# Patient Record
Sex: FEMALE | Race: WHITE
[De-identification: names, ages, dates, MRNs, and addresses within clinical notes are randomized per-mention and may not be internally consistent; named-entity substitution may affect disease eponyms.]

---

## 2018-05-17 ENCOUNTER — HOSPITAL ENCOUNTER (OUTPATIENT)
Dept: HOSPITAL 88 - ER | Age: 58
Setting detail: OBSERVATION
LOS: 1 days | Discharge: HOME | End: 2018-05-18
Attending: FAMILY MEDICINE | Admitting: FAMILY MEDICINE
Payer: COMMERCIAL

## 2018-05-17 VITALS — BODY MASS INDEX: 27.29 KG/M2 | HEIGHT: 63 IN | WEIGHT: 154 LBS

## 2018-05-17 VITALS — SYSTOLIC BLOOD PRESSURE: 116 MMHG | DIASTOLIC BLOOD PRESSURE: 69 MMHG

## 2018-05-17 VITALS — DIASTOLIC BLOOD PRESSURE: 69 MMHG | SYSTOLIC BLOOD PRESSURE: 116 MMHG

## 2018-05-17 LAB
ALBUMIN SERPL-MCNC: 3.7 G/DL (ref 3.5–5)
ALBUMIN/GLOB SERPL: 1.2 {RATIO} (ref 0.8–2)
ALP SERPL-CCNC: 64 IU/L (ref 40–150)
ALT SERPL-CCNC: 16 IU/L (ref 0–55)
ANION GAP SERPL CALC-SCNC: 14.1 MMOL/L (ref 8–16)
BACTERIA URNS QL MICRO: (no result) /HPF
BASOPHILS # BLD AUTO: 0.1 10*3/UL (ref 0–0.1)
BASOPHILS NFR BLD AUTO: 0.6 % (ref 0–1)
BILIRUB UR QL: NEGATIVE
BUN SERPL-MCNC: 9 MG/DL (ref 7–26)
BUN/CREAT SERPL: 11 (ref 6–25)
CALCIUM SERPL-MCNC: 9.1 MG/DL (ref 8.4–10.2)
CHLORIDE SERPL-SCNC: 104 MMOL/L (ref 98–107)
CHOLEST SERPL-MCNC: 284 MD/DL (ref 0–199)
CHOLEST/HDLC SERPL: 4.5 {RATIO} (ref 3–3.6)
CLARITY UR: CLEAR
CO2 SERPL-SCNC: 25 MMOL/L (ref 22–29)
COLOR UR: YELLOW
DEPRECATED FTI SERPL-MCNC: 2.22 UG/DL (ref 1.4–3.8)
DEPRECATED INR PLAS: 0.91
DEPRECATED NEUTROPHILS # BLD AUTO: 5.6 10*3/UL (ref 2.1–6.9)
DEPRECATED RBC URNS MANUAL-ACNC: (no result) /HPF (ref 0–5)
EGFRCR SERPLBLD CKD-EPI 2021: > 60 ML/MIN (ref 60–?)
EOSINOPHIL # BLD AUTO: 0.1 10*3/UL (ref 0–0.4)
EOSINOPHIL NFR BLD AUTO: 0.9 % (ref 0–6)
EPI CELLS URNS QL MICRO: (no result) /LPF
ERYTHROCYTE [DISTWIDTH] IN CORD BLOOD: 13.4 % (ref 11.7–14.4)
GLOBULIN PLAS-MCNC: 3.2 G/DL (ref 2.3–3.5)
GLUCOSE SERPLBLD-MCNC: 97 MG/DL (ref 74–118)
HCT VFR BLD AUTO: 44.8 % (ref 34.2–44.1)
HDLC SERPL-MSCNC: 63 MG/DL (ref 40–60)
HGB BLD-MCNC: 14.9 G/DL (ref 12–16)
HYALINE CASTS #/AREA URNS LPF: (no result) /[LPF] (ref 0–1)
KETONES UR QL STRIP.AUTO: NEGATIVE
LDLC SERPL CALC-MCNC: 201 MG/DL (ref 60–130)
LEUKOCYTE ESTERASE UR QL STRIP.AUTO: NEGATIVE
LYMPHOCYTES # BLD: 2.5 10*3/UL (ref 1–3.2)
LYMPHOCYTES NFR BLD AUTO: 28.8 % (ref 18–39.1)
MCH RBC QN AUTO: 30 PG (ref 28–32)
MCHC RBC AUTO-ENTMCNC: 33.3 G/DL (ref 31–35)
MCV RBC AUTO: 90.1 FL (ref 81–99)
MONOCYTES # BLD AUTO: 0.4 10*3/UL (ref 0.2–0.8)
MONOCYTES NFR BLD AUTO: 4.5 % (ref 4.4–11.3)
MUCOUS THREADS URNS QL MICRO: (no result)
NEUTS SEG NFR BLD AUTO: 64.7 % (ref 38.7–80)
NITRITE UR QL STRIP.AUTO: NEGATIVE
PLATELET # BLD AUTO: 237 X10E3/UL (ref 140–360)
POTASSIUM SERPL-SCNC: 4.1 MMOL/L (ref 3.5–5.1)
PROT UR QL STRIP.AUTO: NEGATIVE
PROTHROMBIN TIME: 11.5 SECONDS (ref 11.9–14.5)
RBC # BLD AUTO: 4.97 X10E6/UL (ref 3.6–5.1)
SODIUM SERPL-SCNC: 139 MMOL/L (ref 136–145)
SP GR UR STRIP: 1.01 (ref 1.01–1.02)
TRIGL SERPL-MCNC: 102 MG/DL (ref 0–149)
TSH SERPL DL<=0.005 MIU/L-ACNC: 1.34 UIU/ML (ref 0.35–4.94)
UROBILINOGEN UR STRIP-MCNC: 0.2 MG/DL (ref 0.2–1)
WBC #/AREA URNS HPF: (no result) /HPF (ref 0–5)

## 2018-05-17 PROCEDURE — 84702 CHORIONIC GONADOTROPIN TEST: CPT

## 2018-05-17 PROCEDURE — 81001 URINALYSIS AUTO W/SCOPE: CPT

## 2018-05-17 PROCEDURE — 85025 COMPLETE CBC W/AUTO DIFF WBC: CPT

## 2018-05-17 PROCEDURE — 70544 MR ANGIOGRAPHY HEAD W/O DYE: CPT

## 2018-05-17 PROCEDURE — 82607 VITAMIN B-12: CPT

## 2018-05-17 PROCEDURE — 83036 HEMOGLOBIN GLYCOSYLATED A1C: CPT

## 2018-05-17 PROCEDURE — 84443 ASSAY THYROID STIM HORMONE: CPT

## 2018-05-17 PROCEDURE — 70547 MR ANGIOGRAPHY NECK W/O DYE: CPT

## 2018-05-17 PROCEDURE — 36415 COLL VENOUS BLD VENIPUNCTURE: CPT

## 2018-05-17 PROCEDURE — 85610 PROTHROMBIN TIME: CPT

## 2018-05-17 PROCEDURE — 93005 ELECTROCARDIOGRAM TRACING: CPT

## 2018-05-17 PROCEDURE — 80061 LIPID PANEL: CPT

## 2018-05-17 PROCEDURE — 70450 CT HEAD/BRAIN W/O DYE: CPT

## 2018-05-17 PROCEDURE — 93306 TTE W/DOPPLER COMPLETE: CPT

## 2018-05-17 PROCEDURE — 80053 COMPREHEN METABOLIC PANEL: CPT

## 2018-05-17 PROCEDURE — 70551 MRI BRAIN STEM W/O DYE: CPT

## 2018-05-17 PROCEDURE — 99284 EMERGENCY DEPT VISIT MOD MDM: CPT

## 2018-05-17 PROCEDURE — 84436 ASSAY OF TOTAL THYROXINE: CPT

## 2018-05-17 PROCEDURE — 84479 ASSAY OF THYROID (T3 OR T4): CPT

## 2018-05-17 NOTE — DIAGNOSTIC IMAGING REPORT
Exams: Brain MRI and cervical MRA and intracranial MRA without IV contrast



History: Left arm paresthesia

Comparison studies: Head CT 5/17/2018 and 5/5/2010.



Technique: 

Brain MRI: Sagittal and axial T2, axial coronal T2 flair, axial T2*GRE, axial

T1 and axial DWI.

Cervical MRA: Axial 2-D time-of-flight with 3-D MIP reformats.

Intracranial MRA: Axial 3-D time-of-flight with 3-D MIP reformats..

Intravenous contrast: None



Findings:



Brain:



Scalp: No abnormal signal.  No masses.

Bone marrow: Normal in signal intensity.



Brain sulci: Normal in size.

Ventricles: Normal in size. No hydrocephalus.

Extra-axial spaces: Subtle scattered nonspecific leptomeningeal increased T2

FLAIR hyperintensity. No mass or extra-axial fluid collection.



Parenchyma:

No abnormal signal intensities.

No masses, hemorrhage, acute or chronic vascular insults.



Suprasellar region: No abnormalities.

Craniocervical junction: No abnormalities. The foramen magnum is patent. No

Chiari malformations.

Vessels: Normal flow-voids in the arteries and sinuses.



Cervical MRA:



If present, stenosis at the carotid bulbs is calculated utilizing the NASCET

method which calculates the degree of stenosis with reference to the normal

lumen of the carotid artery distal to the stenosis.





Exam is limited by artifacts related to patient and pulsation motion artifacts.



Common carotid arteries: Cannot adequately evaluate the origin of the left

common carotid artery as it lies outside imaged field-of-view. Otherwise

patent, no flow abnormalities bilaterally in the remaining segments.

Carotid bulbs: No gross hemodynamically significant stenosis at the carotid

bulbs.

Internal carotid arteries: Patent, no flow abnormalities.



Vertebral arteries:  Cannot adequately evaluate the origins due to motion

artifacts. Otherwise patent, no signal abnormalities bilaterally in the

remaining segments.



Intracranial MRA:



Internal carotid arteries: Patent, no flow signal abnormalities.

Anterior cerebral arteries: Patent, no flow signal abnormalities in the A1 and

A2 segments.

Middle cerebral arteries: Patent, no flow abnormalities in the M1 and proximal

M2 segments.



Vertebral arteries:

Patent, no flow abnormalities.

Basilar artery: Patent, no flow abnormalities.

Posterior cervical arteries: Patent, no flow abnormalities. The right T1

segment is mildly hypoplastic and there is a anatomical variant prominent right

posterior commuting artery.



Anatomical variants:

Acom: Visualized with variant trifurcated A2 segments.

Pcoms: Patent on the right. Not visualized on the left.

Vertebral arteries: Left is slightly dominant.



IMPRESSION:



Brain:

1.  Subtle scattered leptomeningeal increased T2 FLAIR hyperintense signal

changes are nonspecific and may artifactual. Similar findings have been

reported patient's who are undergoing hyper-oxygenation therapy or have had

recent anesthesia in the appropriate clinical setting. Canal exclude the

possibility of nonspecific leptomeningitis. Lumbar puncture could further

evaluate as clinically warranted.

2.  No additional intracranial abnormalities.





Cervical MRA:

1.  Limited exam due to motion artifacts.

2.  Patent carotid and vertebral arteries.

3.  No gross hemodynamically significant stenosis at the cervical carotid bulbs

(approximately 0% stenosis by NASCET).



Intracranial MRA:

No abnormalities.







Signed by: Dr. Guillermo Marin M.D. on 5/17/2018 5:53 PM

## 2018-05-17 NOTE — DIAGNOSTIC IMAGING REPORT
Examination: CT BRAIN WITHOUT CONTRAST



History:Left-sided arm numbness.

Comparison studies:Head CT performed May 5, 2010.



Technique:

Axial images were obtained from the skull base to the vertex.

Coronal and sagittal images reconstructed from the axial data.

Intravenous contrast: None



Findings:



Scalp: No abnormalities.

Bones: No fractures, blastic or lytic lesions.



Brain sulci: Appropriate for age.

Ventricles: Normal in size and configuration. No hydrocephalus.



Extra-axial space:

No abnormalities.



Parenchyma: 

No abnormal densities. 

No masses, hemorrhage, or acute or chronic cortical based vascular insults.



Sellar/suprasellar region: No abnormalities.

Craniocervical junction: Patent foramen magnum. No Chiari one malformation.



Incidental findings: 

None.



Impression:

 

No new or acute intracranial abnormalities. No change from prior head CT

performed May 5, 2010.



Signed by: Dr. Janay Hebert M.D. on 5/17/2018 11:25 AM

## 2018-05-17 NOTE — HISTORY AND PHYSICAL
CHIEF COMPLAINT:  A 58-year-old female comes with left arm weakness and 

paresthesias in the left.



HISTORY OF PRESENTING ILLNESS:  His is Ms. Bhumika Bernard, a 58-year-old 

lady with a history of hypertension, history of attention deficit disease, 

who was in her usual state of health until a week prior to admission the 

patient had right-sided weakness and right-sided paresthesias with some 

aphasia. Was seen at the emergency room. Patient was supposed to be 

admitted, but due to the lack of personnel, apparently the patient decided 

to leave the hospital and came to my office. MRI was ordered, and 

echocardiogram was ordered. The patient has not still done those tests, and 

today the patient comes in with left-sided paresthesias and weakness and 

the patient is admitted for possible TIA versus stroke. No aphasia at this 

time. 



PAST MEDICAL HISTORY:  History of hypertension and also a history of 

attention deficit disorder and insomnia.



SURGICAL HISTORY:  Noncontributory.



SOCIAL HISTORY:  Positive for smoking. No ETOH. No IV drug abuse either.



REVIEW OF SYSTEMS:  Negative for chest pain. Positive for some shortness of 

breath. No nausea, vomiting, diarrhea. No constipation, no rectal bleeding. 

No hematochezia, no hematemesis. No diplopia, no blurry vision. Positive 

for paresthesia, positive for weakness on left side this time. Also a 

history of weakness and aphasia in the past.



PHYSICAL EXAMINATION

GENERAL:  Patient is alert and oriented x3. 

VITAL SIGNS:  Temperature is not recorded yet. Pulse was 112 on arrival. 

Now it is 85. Blood pressure is 125/78.

HEENT:  Normocephalic, atraumatic. Pupils react to light and accommodation.

CARDIOVASCULAR:  S1 and S2 normal. Regular.

ABDOMEN:  Nontender, nondistended.

EXTREMITIES:  No clubbing, no cyanosis, no edema. 

NEUROLOGICAL:  Essentially nonfocal at this time. The patient does not show 

any focal sensory or motor deficits at this time. 



LABORATORY VALUES:  Initial white count is 8.6, hemoglobin 14.9, hematocrit 

44.8. Chemistry:  Sodium of 139, potassium 4.9, creatinine 0.79, BUN of 9. 

Coags are negative.



CT scan initial shows no new intracranial abnormalities, normal parenchyma, 

appropriate-for-age brain foci. Scalp, no abnormalities. 



ASSESSMENT:  Focal weakness, left-sided. A consult with Dr. Oviedo has 

been done. Will also go ahead and do an MRI and MRA of the neck. Also an 

echocardiogram will be done. Will also do a lipid panel. Further 

recommendations on clinical course. The patient has been given aspirin and 

will follow up with Neurology and on the MRIs.











DD:  05/17/2018 15:00

DT:  05/17/2018 15:41

Job#:  [id number]

## 2018-05-17 NOTE — XMS REPORT
Patient Summary Document

 Created on: 2018



DORINDA THACKER

External Reference #: [id number]

: 1960

Sex: Female



Demographics







 Address  [address] [place], [place] [id number]

 

 Home Phone  [phone]

 

 Preferred Language  Unknown

 

 Marital Status  Unknown

 

 Anabaptism Affiliation  Unknown

 

 Race  Unknown

 

 Additional Race(s)  

 

 Ethnic Group  Unknown





Author







 Author  [facility]

 

 Address  Unknown

 

 Phone  Unavailable







Care Team Providers







 Care Team Member Name  Role  Phone

 

 GENI MOONEY  Unavailable  Unavailable







Problems

This patient has no known problems.



Allergies, Adverse Reactions, Alerts

This patient has no known allergies or adverse reactions.



Medications

This patient has no known medications.



Results







 Test Description  Test Time  Test Comments  Text Results  Atomic Results  
Result Comments









 CT BRAIN WO            [place] [address]      Patient Name: DORINDA THACKER   MR #: [id number]    : 1960 Age/Sex: 58/F  Acct #: 
[id number] Req #: [id number]  Adm Physician:     Ordered by: GENI MOONEY MD  Report #: [id number]   Location: ER  Room/Bed:     ________________________
___________________________________________________________________________    
Procedure: [id number] CT/CT BRAIN WO  Exam Date: 18                       
     Exam Time: 1038       REPORT STATUS: Signed    Examination: CT BRAIN 
WITHOUT CONTRAST      History:Left-sided arm numbness.   Comparison studies:
Head CT performed May 5, 2010.      Technique:   Axial images were obtained 
from the skull base to the vertex.   Coronal and sagittal images reconstructed 
from the axial data.   Intravenous contrast: None      Findings:      Scalp: No 
abnormalities.   Bones: No fractures, blastic or lytic lesions.      Brain sulci
: Appropriate for age.   Ventricles: Normal in size and configuration. No 
hydrocephalus.      Extra-axial space:   No abnormalities.      Parenchyma:    
No abnormal densities.    No masses, hemorrhage, or acute or chronic cortical 
based vascular insults.      Sellar/suprasellar region: No abnormalities.   
Craniocervical junction: Patent foramen magnum. No Chiari one malformation.    
  Incidental findings:    None.      Impression:       No new or acute 
intracranial abnormalities. No change from prior head CT   performed May 5, 
2010.      Signed by: Dr. Janay Hebert M.D. on 2018 11:25 AM        
Dictated By: JANAY MARION MD  Electronically Signed By: JANAY MARION MD on 18 1125  Transcribed By: ZANE on 18 1125  
     COPY TO:   GENI MOONEY MD

## 2018-05-17 NOTE — XMS REPORT
Clinical Summary

 Created on: 2018



Dorinda Bernard

External Reference #: [id number]

: 1960

Sex: Female



Demographics







 Address  [address] [place] [id number]

 

 Home Phone  [phone]

 

 Preferred Language  English

 

 Marital Status  Single

 

 Faith Affiliation  Unknown

 

 Race  White

 

 Ethnic Group  Non-





Author







 Author  [facility]

 

 Organization  [facility]

 

 Address  Unknown

 

 Phone  Unavailable







Support







 Name  Relationship  Address  Phone

 

 Ancelmo Duffy  Unknown  [phone]







Care Team Providers







 Care Team Member Name  Role  Phone

 

 Asked, Pcp  PCP  Unavailable







Allergies

No Known Allergies



Current Medications







      



  Prescription   Sig.   Disp.   Refills   Start   End Date   Status



      Date  

 

      



  omeprazole (PriLOSEC) 40   Take 40 mg by mouth       Active



  MG capsule   daily.     

 

      



  rOPINIRole (REQUIP) 0.5   Take 0.5 mg by mouth       Active



  MG tablet   nightly.     

 

      



  dextroamphetamine/ampheta   Take by mouth 2 (two)       Active



  mine (ADDERALL ORAL)   times a day.     

 

      



  aspirin 81 mg chewable   Chew 1 tablet (81 mg   30 tablet   0   05/11/20   06/
10/20   Active



  tablet   total) daily for 30 days.     18   18 







Active Problems







 



  Problem   Noted Date

 

 



  Transient cerebral ischemia   2018







Encounters







    



  Date   Type   Specialty   Care Team   Description

 

    



  2018   Emergency   Emergency Medicine   Wilfrido Newton Transient cerebral



     MD   ischemia, unspecified



     Humza Kapoor MD   type (Primary Dx)



after 2017



Social History







    



  Tobacco Use   Types   Packs/Day   Years Used   Date

 

    



  Current Every Day Smoker   Cigarettes   1   40 

 

    



  Smokeless Tobacco: Never   



  Used   









   



  Alcohol Use   Drinks/Week   oz/Week   Comments

 

   



  No   









 



  Sex Assigned at Birth   Date Recorded

 

 



  Not on file 







Last Filed Vital Signs







  



  Vital Sign   Reading   Time Taken

 

  



  Blood Pressure   121/74   2018  9:47 PM CDT

 

  



  Pulse   84   2018  9:47 PM CDT

 

  



  Temperature   36.9   C (98.4   F)   2018  9:47 PM CDT

 

  



  Respiratory Rate   19   2018  9:47 PM CDT

 

  



  Oxygen Saturation   99%   2018  9:47 PM CDT

 

  



  Inhaled Oxygen   -   -



  Concentration  

 

  



  Weight   68 kg (150 lb)   2018  6:20 PM CDT

 

  



  Height   160 cm (5' 3")   2018  6:20 PM CDT

 

  



  Body Mass Index   26.57   2018  6:20 PM CDT







Plan of Treatment







   



  Health Maintenance   Due Date   Last Done   Comments

 

   



  CERVICAL CANCER SCREENING   1983  

 

   



  BREAST CANCER SCREENING   2012  

 

   



  COLON CANCER SCREENING   2012  

 

   



  SHINGRIX VACCINE (#1)   2012  

 

   



  INFLUENZA VACCINE   2018  







Results

* CT Head Wo Contrast (2018  7:24 PM)





 



  Specimen   Performing Laboratory

 

 



   [facility]



   [address] [place] [address]









 Narrative

 

 



Procedure:CT HEAD WO CONTRAST



 



REFERRING PHYSICIAN:WILFRIDO NEWTON



 



HISTORY:poss TIA 



 



COMPARISON:



 



None



 



TECHNIQUE:



 



Axial images were obtained of the head without intravenous contrast.



 



All CT scan performed using radiation dose reduction techniques. Technical 
factors are evaluated and adjusted to ensure appropriate moderation of 
exposure. Automated dose management technology is applied to adjust the 
radiation dose to minimize expose 



whileachieving a diagnostic quality image.



 



 



FINDINGS:



 



 



 



Grey-white matter differentiation is maintained.



 



The ventricular system is symmetric and midline.



 



Mild chronic ischemic small vessel white matter disease is seen.



 



There is no evidence of acute hemorrhage.



 



Nointra-axial or extra-axial lesion is seen.



 



The visualized portion of the orbits, paranasal sinuses and mastoid air cells 
are unremarkable.



 



The calvarium is intact. 



 



IMPRESSION:



 



No CT evidence of acute intracranial abnormality or hemorrhage.



 



[id number]



 









 Procedure Note

 

 



Hm Interface, Radiology Results Incoming - 2018  7:29 PM CDT



Procedure:CT HEAD WO CONTRAST



REFERRING PHYSICIAN:WILFRIDO NEWTON



HISTORY:      poss TIA   



COMPARISON:



None



TECHNIQUE:



Axial images were obtained of the head without intravenous contrast.



All CT scan performed using radiation dose reduction techniques. Technical 
factors are evaluated and adjusted to ensure appropriate moderation of 
exposure. Automated dose management technology is applied to adjust the 
radiation dose to minimize expose 

while  achieving a diagnostic quality image.





FINDINGS:







Grey-white matter differentiation is maintained.



The ventricular system is symmetric and midline.



Mild chronic ischemic small vessel white matter disease is seen.



There is no evidence of acute hemorrhage.



No  intra-axial or extra-axial lesion is seen.



The visualized portion of the orbits, paranasal sinuses and mastoid air cells 
are unremarkable.



The calvarium is intact. 



IMPRESSION:



No CT evidence of acute intracranial abnormality or hemorrhage.



[id number]







* Estimated GFR (2018  7:00 PM)





  



  Component   Value   Ref Range

 

  



  GFR Non Af Amer   74   mL/min/1.73 m2

 

  



  GFR Af Amer   90   mL/min/1.73 m2



   Comment: 



   Chronic kidney disease: <60 mL/min/1.73m2 



   Kidney failure: <15 mL/min/1.73m2 



   The estimated GFR is calculated from the 



   IDMS-traceable Modification of Diet 



   in Renal Disease Equation. The accuracy of the 



   calculation is poor when the 



   creatinine is normal. Calculated values >90 



   mL/min/1.73m2 are not reported. 



   This equation has not been validated in children 



   (<18 years), pregnant 



   women, the elderly (>70 years), or ethnic groups 



   other than Caucasians and 



    Americans. 









 



  Specimen   Performing Laboratory

 

 



  Plasma specimen   [facility]



   [address]



   [place] [address]





* Troponin (2018  7:00 PM)





  



  Component   Value   Ref Range

 

  



  Troponin   <0.01   0.00 - 0.60 ng/mL



   Comment: 



   0.11 - 1.49 ng/ml                May indicate 



   increased risk of acute 



   coronary 



   syndrome. 



   >=1.5 ng/ml                            Consistent 



   with acute myocardial 



   infarction. 



   The diagnostic value of a single normal or 



   non-diagnostic 



   result is questionable.    Serial samples at 2-6 



   hour intervals 



   are required to rule out acute myocardial 



   injury. 









 



  Specimen   Performing Laboratory

 

 



  Plasma specimen   [facility]



   [address]



   [place] [address]





* Partial thromboplastin time, activated (2018  7:00 PM)





  



  Component   Value   Ref Range

 

  



  PTT   26.0   23.0 - 36.0 sec



   Comment: 



   PTT therapeutic range for unfractionated heparin 



   is 



   61.0-112.0 seconds which corresponds to Anti-Xa 



   0.3-0.7 U/ml. 



   Note:    Change in Panic Value 



   The PTT Panic Value is changing from 110 sec. to 



   100 sec. 



   due to new instrumentation and reagents. 



   Correlation studies have been performed to 



   validate this result. 









 



  Specimen   Performing Laboratory

 

 



  Blood   [facility]



   [address]



   [place] [address]





* Prothrombin time with INR (2018  7:00 PM)





  



  Component   Value   Ref Range

 

  



  Prothrombin time   12.6   12.0 - 15.0 sec

 

  



  INR   0.93   0.92 - 1.12



   Comment: 



   For patients on anticoagulant therapy, reference 



   ranges below: 



   Indication: 



   INR Value 



   Treatment of Venous 



   Thrombosis,                     2.0-3.0 



   pulmonary emboli, or prophylaxis 



   of a venous thrombosis, or systemic 



   emboli. 



   High dose, high risk 



   patients                         3.0-4.5 



   with mechanical valves. 



   NOTE:    INR values over 3.0 are sometimes 



   associated with 



   gastrointestinal hemorrhage, especially values 



   over 4.0. 









 



  Specimen   Performing Laboratory

 

 



  Blood   [facility]



   [address]



   [place] [address]





* CBC with platelet and differential (2018  7:00 PM)





  



  Component   Value   Ref Range

 

  



  WBC   11.0   4.2 - 11.0 k/uL

 

  



  RBC   4.63   4.04 - 5.86 m/uL

 

  



  HGB   13.9   11.5 - 15.3 g/dL

 

  



  HCT   43.1   34.0 - 45.0 %

 

  



  MCV   93.1   80.0 - 98.0 fL

 

  



  MCH   30.0   27.0 - 34.0 pg

 

  



  MCHC   32.3   31.5 - 36.5 g/dL

 

  



  RDW - SD   45.4   37.0 - 51.0 fL

 

  



  MPV   11.2 (H)   7.4 - 10.4 fL

 

  



  Platelet count   223   150 - 400 k/uL

 

  



  Nucleated RBC   0.00   /100 WBC

 

  



  Neutrophils   59.0   36.0 - 66.0 %

 

  



  Lymphocytes   32.7   24.0 - 44.0 %

 

  



  Monocytes   6.5 (H)   0.0 - 6.0 %

 

  



  Eosinophils   1.0   0.0 - 6.0 %

 

  



  Basophils   0.5   0.0 - 1.2 %

 

  



  Immature granulocytes   0.3   0.0 - 1.0 %









 



  Specimen   Performing Laboratory

 

 



  Blood   [facility]



   [address]



   [place] [address]





* B natriuretic peptide (2018  7:00 PM)





  



  Component   Value   Ref Range

 

  



  BNP   17   0 - 100 pg/mL









 



  Specimen   Performing Laboratory

 

 



  Blood   [facility]



   [address]



   [place] [address]





* Lipase level (2018  7:00 PM)





  



  Component   Value   Ref Range

 

  



  Lipase   152   65 - 230 U/L









 



  Specimen   Performing Laboratory

 

 



  Plasma specimen   [facility]



   [address]



   [place] [address]





* Hepatic function panel (2018  7:00 PM)





  



  Component   Value   Ref Range

 

  



  Albumin   3.3   3.2 - 5.0 g/dL

 

  



  Total bilirubin   0.4   0.2 - 1.2 mg/dL

 

  



  Bilirubin direct   0.1   0.0 - 0.4 mg/dL

 

  



  Alkaline phosphatase   65   30 - 120 U/L

 

  



  Protein   6.8   6.3 - 8.2 g/dL

 

  



  ALT   22 (L)   30 - 65 U/L

 

  



  AST   14 (L)   15 - 37 U/L









 



  Specimen   Performing Laboratory

 

 



  Plasma specimen   [facility]



   [address]



   [place] [address]





* Basic metabolic panel (2018  7:00 PM)





  



  Component   Value   Ref Range

 

  



  Sodium   140   135 - 150 mEq/L

 

  



  Potassium   3.5   3.5 - 5.0 mEq/L

 

  



  Chloride   108   100 - 109 mEq/L

 

  



  CO2   23 (L)   24 - 32 mmol/L

 

  



  Anion gap   9   7 - 15 mEq/L



   Comment: 



   Starting from  , anion gap 



   calculation 



   no longer incorporates potassium. Please note the 



   change. 

 

  



  BUN   10   7 - 18 mg/dL

 

  



  Creatinine   0.8   0.8 - 1.5 mg/dL

 

  



  Glucose   95   65 - 100 mg/dL

 

  



  Calcium   8.2 (L)   8.6 - 10.7 mg/dL









 



  Specimen   Performing Laboratory

 

 



  Plasma specimen   [facility]



   [address]



   [place] [address]





after 2017



Insurance







     



  Payer   Benefit   Subscriber ID   Type   Phone   Address



   Plan /    



   Group    

 

     



  [employer]   [employer] PPO   xxxxxxxxxx   PPO  



   OPEN    



   CHOICE    









     



  Guarantor Name   Account   Relation to   Date of   Phone   Billing Address



   Type   Patient   Birth  

 

     



  DORINDA BERNARD   Personal/F   Self   1960   Home:   [address] [place] [address]     [phone]   [place] [address]

## 2018-05-18 VITALS — SYSTOLIC BLOOD PRESSURE: 121 MMHG | DIASTOLIC BLOOD PRESSURE: 64 MMHG

## 2018-05-18 VITALS — SYSTOLIC BLOOD PRESSURE: 116 MMHG | DIASTOLIC BLOOD PRESSURE: 69 MMHG

## 2018-05-18 VITALS — SYSTOLIC BLOOD PRESSURE: 109 MMHG | DIASTOLIC BLOOD PRESSURE: 57 MMHG

## 2018-05-18 NOTE — CONSULTATION
DATE OF CONSULTATION:  May 17, 2018 



NEUROLOGY CONSULT NOTE



HISTORY OF PRESENT ILLNESS:   Ms. Bernard is a 58-year-old 

right-hand-dominant woman with past medical history significant for 

hypertension, coronary artery disease status post heart attack, recent 

transient ischemic attack, and tobacco use who presented to the emergency 

center at [facility] on the morning of May, 17 2018 following 

the abrupt onset of left arm weakness, numbness, and tingling.



Between 9:15 a.m. and 930 a.m. on the morning of admission, the patient 

experienced the sudden onset of left arm heaviness which is further 

described as weakness, numbness affecting the tips of all fingers of the 

left hand, and tingling affecting the whole left arm.  There was no visual 

field cut or other disturbance, dysarthria, aphasia, facial droop, weakness 

in other extremities, impairment of gait or balance, dizziness, or 

confusion.  Ms. Bernard informed her employer of her symptoms, but 

declined medical evaluation.  After the symptoms persisted for 

approximately 1 hour, the patient's employer called Ms. Bernard's 
significant other 

who brought the patient to the emergency center at [facility] 

for further evaluation.



Upon admission to the emergency center, the patient was afebrile with a blood 

pressure of 181/126 mmHg with a pulse of 114 beats per minute.  The only 

significant finding on the patient's neurological examination was mild 

drift of the left arm.  A CT of the brain without contrast was performed 

and did not show evidence of recent ischemia, hemorrhage, mass or mass 

effect.  Ms. Bernard will be admitted to the hospital under observation 

status for further evaluation and treatment of her symptoms.



Approximately 1 week ago, (2018), the patient was at work when she 

experienced the sudden onset of loss of vision in the right eye, right 

facial droop, dysarthria, and possible receptive aphasia.  When the 

patient's employer observed these symptoms, she called the patient's 

significant other who took the patient to an urgent care center.  While at 

the urgent care center, the patient's symptoms spontaneously resolved.  Ms. Bernard reports the above-described symptoms lasted for approximately 2 

hours.  Medical personnel at the urgent care center informed the patient 

she had experienced transient ischemic attack and transferred her to the 

emergency center of a local hospital so she could be admitted for further 

evaluation and treatment.  However, after waiting in the emergency center 

for approximately 5 hours, the patient left because she had not seen a 

physician or other medical professional.



Ms. Bernard does endorse neck pain which begins towards the middle of 

the neck and radiates towards the left side.  The pain is described as 

"someone hitting you with a hammer" and is rated a 7 out of 10.  Ms. Bernard endorses a headache as well.  The pain is located unilaterally 

over the left side of the head.  The pain is described as squeezing and is 

rated 10 out of 10.  There is no photophobia, phonophobia, nausea, or 

vomiting associated with the headache.



REVIEW OF SYSTEMS:  Headache, neck pain, weakness of the left arm, numbness 

and tingling of  the left arm, recent loss of vision in the right eye 

(resolved), recent dysarthria with or without receptive aphasia (resolved), 

and recent right facial droop (resolved).  Otherwise the 12 point review of 

systems is negative.



PAST MEDICAL HISTORY:  Hypertension, coronary artery disease status post 

myocardial infarction, rheumatoid arthritis, gastroesophageal reflux 

disease, recent transient ischemic attack, fibromyalgia, restless legs 

syndrome, attention deficit disorder.



PAST SURGICAL HISTORY:   section times 3, partial hysterectomy, 

left foot surgery.



PAST HOSPITALIZATIONS:  Surgeries and procedures as listed.



FAMILY HISTORY:  The patient's paternal grandfather is  from 

emphysema.  Patient's paternal grandmother is , medical history 

unknown.  The patient's maternal grandfather is , medical history 

unknown.  The patient's maternal grandmother is  from cervical 

cancer.  Ms. Khan' father is  from brain cancer.  Her mother 

is alive, but has multiple medical problems including COPD, prior transient 

ischemic attacks, congestive heart failure, and seizures.  The patient had 

3 siblings.  One brother is alive, but estranged from Ms. Bernard.  His 

medical history is unknown.  A second brother is  from liver 

failure.  A sister is  from metastatic breast cancer.  The patient 

has 3 daughters who are alive and healthy.  Ms. Bernard reports multiple 

maternal relatives have diabetes mellitus. 



SOCIAL HISTORY:  Patient is single.  She attended school through the 12th 

grade and subsequently obtained her GED.  Ms. Bernard works at a [employer] 

facility.  Patient does report tobacco use; she has smoked 1 pack of 

cigarettes per day for the past 40 years.  The patient reports occasional 

alcohol use.  She does not report current or prior recreational drug use.



MEDICATIONS:  Ropinirole 0.5 mg by mouth one hour before bedtime daily,  D 

amphetamine 20 mg by mouth every morning, omeprazole 40 mg by mouth daily, 

Lasix, dose unknown by mouth daily.



ALLERGIES:  THE PATIENT DOES NOT REPORT ALLERGIES TO MEDICINES.  HOWEVER, 

HER ELECTRONIC MEDICAL RECORD REPORTS ALLERGIES TO PENICILLIN AND CEFACLOR. 

 NO KNOWN FOOD ALLERGIES.  POSITIVE LATEX ALLERGY.  NO KNOWN ALLERGIES TO 

IODINE OR OTHER CONTRAST MATERIALS.



PHYSICAL EXAMINATION:   

VITAL SIGNS:  Height 64 inches, weight 160 pounds, BMI 27.5 kg per meter 

squared.  Blood pressure 123/98 mmHg.  Pulse 86 beats per minute.  

Respiratory rate 18 breaths per minute.  Oxygen saturation 96% on room air. 

GENERAL:  The patient is awake and alert.  Does not appear distressed.  

Overweight.

HEENT:  Normocephalic and atraumatic.  Pupils are equal, round and reactive 

to light.  Moist mucous membranes.  

NECK:  Supple.  No appreciable thyromegaly.  No appreciable carotid bruits. 

CARDIOVASCULAR:  S1 and S2.  Regular rate and rhythm.  No murmurs, rubs or 

gallops.  

RESPIRATORY:  Clear to auscultation bilaterally.  No wheezes, rhonchi or 

rales.

EXTREMITIES:  The skin is warm and dry.  No clubbing, cyanosis or edema.  

The posterior tibial and dorsalis pedis pulses are 2+ and symmetric.

SKIN:  No rashes or lesions.  

NEUROLOGIC:  Memory/attention:  The patient is awake and alert.  Oriented 

to person, place, time, and situation.  

CRANIAL NERVES:  Cranial nerve I:  Not tested.  Cranial nerves II, III, IV, 

VI:  Pupils are equal and round, react briskly to light (from 4 mm to 2 

mm).  Extraocular movements intact.  No nystagmus.  Cranial nerve V:  

Sensation to light touch and pinprick is intact in the bilateral V1 through 

V3 distributions.  Strength of the temporalis and masseter muscles is 

within normal limits.  Cranial nerve VII:  The face is symmetric, as are 

all facial movements.  Strength is within normal limits.  Cranial nerve 

VIII:  Hearing is intact to finger rub bilaterally.  Cranial nerve IX and 

X:  The soft palate elevates equally and symmetrically.  Cranial nerve XI:  

Normal strength of the bilateral sternocleidomastoid and trapezius muscles. 

 Cranial nerve XII:  The tongue protrudes midline and moves symmetrically 

from side to side.  

STRENGTH:  Bulk is normal, and strength is 5/5 in the bilateral deltoids, 

biceps, triceps, wrist flexors and extensors, finger flexors and extensors, 

intrinsic hand muscles, hip flexors, knee flexors and extensors, ankle 

dorsiflexion and plantar flexion, and intrinsic foot muscles.  Tone is 

normal.  

DTRs:  Deep tendon reflexes are 2+ and symmetric at the triceps, biceps, 

brachioradialis and  patellas.  Deep tendon reflexes are trace and 

symmetric at the Achilles.  Plantar responses are flexor bilaterally.  

Absent clonus.  

SENSATION:  Intact to light touch and pinprick in both arms and both legs.  

CEREBELLAR:  Finger-nose-finger and heel-shin movements are intact without 

dysmetria or other impairment.  Rapid alternating movements are intact.  

GAIT:  Deferred.

SPEECH:  Spontaneous speech is normal without appreciable dysarthria or 

aphasia.  Repetition is intact.  

INVOLUNTARY MOVEMENTS:  None.  

PRONATOR DRIFT:  None.

 

LABORATORY DATA:  Sodium 139, potassium 4.1, chloride 104, carbon dioxide 

25, anion gap 14.1, BUN 9, creatinine 0.79, estimated GFR greater than 60, 

BUN to creatinine ratio 11, glucose 97, calcium 9.1, total bilirubin 0.5, 

AST 15, ALT 16, alkaline phosphatase 64, total protein 6.9, albumin 3.7, 

globulin 3.2, albumin to globulin ratio 1.2, total cholesterol 284, 

triglycerides 102, , HDL 63, and vitamin B12 279, TSH 1.344, free T4 

index 2.2199, thyroxine 8.02, T3 uptake 27.68, HCG negative.  CBC with 

differential and platelets reveals a white blood cell count of 8.67 with a 

normal differential.  The hemoglobin and hematocrit are 14.9 and 44.8, 

respectively.  Platelet count is 237,000.  PT 11.5.  INR 0.91.  Urinalysis 

is unremarkable.



DIAGNOSTIC STUDIES:  CT of the brain without contrast 2018.  On my 

review, there is no evidence of large territorial ischemia, hemorrhage, 

mass or mass effect.  MRI brain without contrast on 2018:  On my 

review, there is no evidence of remote or recent large territorial 

ischemia, hemorrhage, mass or mass effect.  Cerebral volume is appropriate 

for age.  Nonspecific T2/flair hyper-intensities at the periphery are 

artifactual.  MRA of the brain and neck without contrast 2018:  There 

is no hemodynamically significant intra- or extracranial stenoses. EKG on 

2018:  Normal sinus rhythm at 93 beats per minute. Echocardiogram on 

2018:  Ejection fraction 60% to 65%.  Trace mitral and tricuspid 

regurgitation.  No significant valvular abnormalities. 



ASSESSMENT AND PLAN:  Ms. Bernard is a 58-year-old right-hand-dominant 

woman with multiple vascular risk factors presenting to the emergency 

center at [facility] following the abrupt onset of left arm 

weakness, numbness, and tingling.  Patient's neurological examination is 

nonfocal.  Her laboratory data and other diagnostic studies have been 

reviewed and are documented above.



The symptoms in Ms. Bernard's left arm have persisted for several hours. 

 However, there is no evidence of recent ischemia on either the CT of the 

brain without contrast or MRI of the brain without contrast.  This suggests 

the symptoms in the patient's left arm have a different etiology.  In my 

opinion, Ms. Bernard probably has a left cervical radiculopathy.  The 

cervical radiculopathy would be best evaluated as an outpatient.



Though the patient's current symptoms do not represent a stroke, it does 

appear Ms. Bernard had a transient ischemic attack approximately 1 week 

ago.  However, she eloped from the  emergency center before undergoing 

further evaluation and treatment.  



RECOMMENDATIONS:  

1. Hemoglobin A1c.  

2. Speech and physical therapy consultations.  These last 2 items will 

complete a stroke evaluation. 

3. Aspirin 325 mg by mouth daily for stroke prophylaxis.  

4. Patient's blood pressure may be normalized.  Her goal blood pressure 

is less than 130/70 mmHg.  

5. The patient's goal total cholesterol is less than 200 with a LDL of 

less than 70.  As detailed above, the patient's total cholesterol is 284 

with a LDL of 201.  Ms. Bernard will be prescribed atorvastatin 80 mg 

by mouth at bedtime daily for treatment  of hyperlipidemia. 

6. Followup hemoglobin A1c.  Tight glycemic control while the patient is 

in the hospital. 

7. Smoking cessation counseling was provided to the patient.  

8. GI prophylaxis-continue patient's home medication of omeprazole 40 mg 

by mouth daily.  DVT prophylaxis - Lovenox 40 mg subcutaneously daily.  

9. Defer treatment of the remaining medical comorbidities to the primary 

and other services.  



Thank you for this consultation.  I will continue to follow this patient 

while she remains in the hospital.



TIME SPENT:  70 minutes.  



 





DD:  2018 19:11

DT:  2018 19:40

Job#:  [id number]



[id number]

## 2018-05-18 NOTE — CARDIOLOGY REPORT
DATE OF STUDY:    



ECHOCARDIOGRAM  



M-MODE:  Normal chamber wall dimensions.  Normal contractility.  Normal 

mitral and aortic valves.  No pericardial effusion.



SECTOR SCAN:  Aortic root is measuring at 3.4 cm.  Normal left ventricular 

wall thickness and contractility.  Normal mitral, aortic and tricuspid 

valves.  No pericardial effusion.



CARDIAC DOPPLER STUDY WITH COLOR:  Trace pulmonic, tricuspid and mitral 

regurgitation.



CONCLUSIONS

1.  Left ventricular ejection fraction is approximately 65%.

2.  Trace mitral, tricuspid and pulmonic regurgitation of no clinical 

significance.

3.  Aortic root measures 3.4 cm.









DD:  05/18/2018 10:07

DT:  05/18/2018 10:14

Job#:  [id number]



cc:SANTI COELLO MD